# Patient Record
Sex: MALE | Race: WHITE | NOT HISPANIC OR LATINO
[De-identification: names, ages, dates, MRNs, and addresses within clinical notes are randomized per-mention and may not be internally consistent; named-entity substitution may affect disease eponyms.]

---

## 2019-01-03 ENCOUNTER — APPOINTMENT (OUTPATIENT)
Dept: ORTHOPEDIC SURGERY | Facility: CLINIC | Age: 64
End: 2019-01-03
Payer: COMMERCIAL

## 2019-01-03 VITALS — WEIGHT: 250 LBS | HEIGHT: 70 IN | BODY MASS INDEX: 35.79 KG/M2

## 2019-01-03 DIAGNOSIS — M17.0 BILATERAL PRIMARY OSTEOARTHRITIS OF KNEE: ICD-10-CM

## 2019-01-03 DIAGNOSIS — M76.61 ACHILLES TENDINITIS, RIGHT LEG: ICD-10-CM

## 2019-01-03 DIAGNOSIS — M76.62 ACHILLES TENDINITIS, RIGHT LEG: ICD-10-CM

## 2019-01-03 PROCEDURE — 73564 X-RAY EXAM KNEE 4 OR MORE: CPT | Mod: 50

## 2019-01-03 PROCEDURE — 99203 OFFICE O/P NEW LOW 30 MIN: CPT

## 2019-03-21 ENCOUNTER — APPOINTMENT (OUTPATIENT)
Dept: ORTHOPEDIC SURGERY | Facility: CLINIC | Age: 64
End: 2019-03-21

## 2019-04-01 ENCOUNTER — APPOINTMENT (OUTPATIENT)
Dept: INTERNAL MEDICINE | Facility: CLINIC | Age: 64
End: 2019-04-01

## 2021-06-01 ENCOUNTER — INPATIENT (INPATIENT)
Facility: HOSPITAL | Age: 66
LOS: 0 days | Discharge: HOME | DRG: 603 | End: 2021-06-02
Attending: INTERNAL MEDICINE | Admitting: INTERNAL MEDICINE
Payer: MEDICARE

## 2021-06-01 VITALS
HEIGHT: 70 IN | WEIGHT: 270.07 LBS | OXYGEN SATURATION: 100 % | TEMPERATURE: 98 F | RESPIRATION RATE: 16 BRPM | HEART RATE: 88 BPM

## 2021-06-01 DIAGNOSIS — Z98.89 OTHER SPECIFIED POSTPROCEDURAL STATES: Chronic | ICD-10-CM

## 2021-06-01 DIAGNOSIS — L08.9 LOCAL INFECTION OF THE SKIN AND SUBCUTANEOUS TISSUE, UNSPECIFIED: ICD-10-CM

## 2021-06-01 DIAGNOSIS — Z98.49 CATARACT EXTRACTION STATUS, UNSPECIFIED EYE: Chronic | ICD-10-CM

## 2021-06-01 LAB
ALBUMIN SERPL ELPH-MCNC: 4.5 G/DL — SIGNIFICANT CHANGE UP (ref 3.3–5.2)
ALP SERPL-CCNC: 76 U/L — SIGNIFICANT CHANGE UP (ref 40–120)
ALT FLD-CCNC: 23 U/L — SIGNIFICANT CHANGE UP
ANION GAP SERPL CALC-SCNC: 11 MMOL/L — SIGNIFICANT CHANGE UP (ref 5–17)
AST SERPL-CCNC: 24 U/L — SIGNIFICANT CHANGE UP
BASOPHILS # BLD AUTO: 0.05 K/UL — SIGNIFICANT CHANGE UP (ref 0–0.2)
BASOPHILS NFR BLD AUTO: 0.6 % — SIGNIFICANT CHANGE UP (ref 0–2)
BILIRUB SERPL-MCNC: 0.5 MG/DL — SIGNIFICANT CHANGE UP (ref 0.4–2)
BUN SERPL-MCNC: 19.5 MG/DL — SIGNIFICANT CHANGE UP (ref 8–20)
CALCIUM SERPL-MCNC: 9.5 MG/DL — SIGNIFICANT CHANGE UP (ref 8.6–10.2)
CHLORIDE SERPL-SCNC: 102 MMOL/L — SIGNIFICANT CHANGE UP (ref 98–107)
CO2 SERPL-SCNC: 25 MMOL/L — SIGNIFICANT CHANGE UP (ref 22–29)
CREAT SERPL-MCNC: 0.82 MG/DL — SIGNIFICANT CHANGE UP (ref 0.5–1.3)
EOSINOPHIL # BLD AUTO: 0.16 K/UL — SIGNIFICANT CHANGE UP (ref 0–0.5)
EOSINOPHIL NFR BLD AUTO: 1.8 % — SIGNIFICANT CHANGE UP (ref 0–6)
GLUCOSE BLDC GLUCOMTR-MCNC: 103 MG/DL — HIGH (ref 70–99)
GLUCOSE BLDC GLUCOMTR-MCNC: 112 MG/DL — HIGH (ref 70–99)
GLUCOSE SERPL-MCNC: 128 MG/DL — HIGH (ref 70–99)
HCT VFR BLD CALC: 47.3 % — SIGNIFICANT CHANGE UP (ref 39–50)
HGB BLD-MCNC: 15.7 G/DL — SIGNIFICANT CHANGE UP (ref 13–17)
IMM GRANULOCYTES NFR BLD AUTO: 0.3 % — SIGNIFICANT CHANGE UP (ref 0–1.5)
LYMPHOCYTES # BLD AUTO: 1.34 K/UL — SIGNIFICANT CHANGE UP (ref 1–3.3)
LYMPHOCYTES # BLD AUTO: 14.9 % — SIGNIFICANT CHANGE UP (ref 13–44)
MCHC RBC-ENTMCNC: 30.4 PG — SIGNIFICANT CHANGE UP (ref 27–34)
MCHC RBC-ENTMCNC: 33.2 GM/DL — SIGNIFICANT CHANGE UP (ref 32–36)
MCV RBC AUTO: 91.7 FL — SIGNIFICANT CHANGE UP (ref 80–100)
MONOCYTES # BLD AUTO: 1 K/UL — HIGH (ref 0–0.9)
MONOCYTES NFR BLD AUTO: 11.1 % — SIGNIFICANT CHANGE UP (ref 2–14)
NEUTROPHILS # BLD AUTO: 6.41 K/UL — SIGNIFICANT CHANGE UP (ref 1.8–7.4)
NEUTROPHILS NFR BLD AUTO: 71.3 % — SIGNIFICANT CHANGE UP (ref 43–77)
PLATELET # BLD AUTO: 218 K/UL — SIGNIFICANT CHANGE UP (ref 150–400)
POTASSIUM SERPL-MCNC: 4.6 MMOL/L — SIGNIFICANT CHANGE UP (ref 3.5–5.3)
POTASSIUM SERPL-SCNC: 4.6 MMOL/L — SIGNIFICANT CHANGE UP (ref 3.5–5.3)
PROT SERPL-MCNC: 7.4 G/DL — SIGNIFICANT CHANGE UP (ref 6.6–8.7)
RBC # BLD: 5.16 M/UL — SIGNIFICANT CHANGE UP (ref 4.2–5.8)
RBC # FLD: 12.6 % — SIGNIFICANT CHANGE UP (ref 10.3–14.5)
SARS-COV-2 RNA SPEC QL NAA+PROBE: SIGNIFICANT CHANGE UP
SODIUM SERPL-SCNC: 137 MMOL/L — SIGNIFICANT CHANGE UP (ref 135–145)
WBC # BLD: 8.99 K/UL — SIGNIFICANT CHANGE UP (ref 3.8–10.5)
WBC # FLD AUTO: 8.99 K/UL — SIGNIFICANT CHANGE UP (ref 3.8–10.5)

## 2021-06-01 PROCEDURE — 73130 X-RAY EXAM OF HAND: CPT | Mod: 26,RT

## 2021-06-01 PROCEDURE — 99223 1ST HOSP IP/OBS HIGH 75: CPT

## 2021-06-01 PROCEDURE — 99222 1ST HOSP IP/OBS MODERATE 55: CPT

## 2021-06-01 PROCEDURE — 99285 EMERGENCY DEPT VISIT HI MDM: CPT

## 2021-06-01 RX ORDER — DEXTROSE 50 % IN WATER 50 %
25 SYRINGE (ML) INTRAVENOUS ONCE
Refills: 0 | Status: DISCONTINUED | OUTPATIENT
Start: 2021-06-01 | End: 2021-06-02

## 2021-06-01 RX ORDER — ZINC SULFATE TAB 220 MG (50 MG ZINC EQUIVALENT) 220 (50 ZN) MG
1 TAB ORAL
Qty: 0 | Refills: 0 | DISCHARGE

## 2021-06-01 RX ORDER — ATORVASTATIN CALCIUM 80 MG/1
80 TABLET, FILM COATED ORAL AT BEDTIME
Refills: 0 | Status: DISCONTINUED | OUTPATIENT
Start: 2021-06-01 | End: 2021-06-02

## 2021-06-01 RX ORDER — ASPIRIN/CALCIUM CARB/MAGNESIUM 324 MG
1 TABLET ORAL
Qty: 0 | Refills: 0 | DISCHARGE

## 2021-06-01 RX ORDER — DEXTROSE 50 % IN WATER 50 %
12.5 SYRINGE (ML) INTRAVENOUS ONCE
Refills: 0 | Status: DISCONTINUED | OUTPATIENT
Start: 2021-06-01 | End: 2021-06-02

## 2021-06-01 RX ORDER — ACETAMINOPHEN 500 MG
650 TABLET ORAL EVERY 4 HOURS
Refills: 0 | Status: DISCONTINUED | OUTPATIENT
Start: 2021-06-01 | End: 2021-06-02

## 2021-06-01 RX ORDER — HYDROMORPHONE HYDROCHLORIDE 2 MG/ML
0.5 INJECTION INTRAMUSCULAR; INTRAVENOUS; SUBCUTANEOUS
Refills: 0 | Status: DISCONTINUED | OUTPATIENT
Start: 2021-06-01 | End: 2021-06-02

## 2021-06-01 RX ORDER — SODIUM CHLORIDE 9 MG/ML
1000 INJECTION, SOLUTION INTRAVENOUS
Refills: 0 | Status: DISCONTINUED | OUTPATIENT
Start: 2021-06-01 | End: 2021-06-02

## 2021-06-01 RX ORDER — ONDANSETRON 8 MG/1
4 TABLET, FILM COATED ORAL EVERY 6 HOURS
Refills: 0 | Status: DISCONTINUED | OUTPATIENT
Start: 2021-06-01 | End: 2021-06-02

## 2021-06-01 RX ORDER — GLUCAGON INJECTION, SOLUTION 0.5 MG/.1ML
1 INJECTION, SOLUTION SUBCUTANEOUS ONCE
Refills: 0 | Status: DISCONTINUED | OUTPATIENT
Start: 2021-06-01 | End: 2021-06-02

## 2021-06-01 RX ORDER — INSULIN LISPRO 100/ML
VIAL (ML) SUBCUTANEOUS
Refills: 0 | Status: DISCONTINUED | OUTPATIENT
Start: 2021-06-01 | End: 2021-06-02

## 2021-06-01 RX ORDER — VANCOMYCIN HCL 1 G
1850 VIAL (EA) INTRAVENOUS ONCE
Refills: 0 | Status: DISCONTINUED | OUTPATIENT
Start: 2021-06-01 | End: 2021-06-01

## 2021-06-01 RX ORDER — OXYCODONE HYDROCHLORIDE 5 MG/1
5 TABLET ORAL EVERY 4 HOURS
Refills: 0 | Status: DISCONTINUED | OUTPATIENT
Start: 2021-06-01 | End: 2021-06-02

## 2021-06-01 RX ORDER — DEXTROSE 50 % IN WATER 50 %
15 SYRINGE (ML) INTRAVENOUS ONCE
Refills: 0 | Status: DISCONTINUED | OUTPATIENT
Start: 2021-06-01 | End: 2021-06-02

## 2021-06-01 RX ORDER — OXYCODONE HYDROCHLORIDE 5 MG/1
5 TABLET ORAL ONCE
Refills: 0 | Status: DISCONTINUED | OUTPATIENT
Start: 2021-06-01 | End: 2021-06-01

## 2021-06-01 RX ORDER — VANCOMYCIN HCL 1 G
1500 VIAL (EA) INTRAVENOUS ONCE
Refills: 0 | Status: COMPLETED | OUTPATIENT
Start: 2021-06-01 | End: 2021-06-01

## 2021-06-01 RX ORDER — ENOXAPARIN SODIUM 100 MG/ML
40 INJECTION SUBCUTANEOUS DAILY
Refills: 0 | Status: DISCONTINUED | OUTPATIENT
Start: 2021-06-01 | End: 2021-06-02

## 2021-06-01 RX ORDER — CHOLECALCIFEROL (VITAMIN D3) 125 MCG
1 CAPSULE ORAL
Qty: 0 | Refills: 0 | DISCHARGE

## 2021-06-01 RX ORDER — CEFTRIAXONE 500 MG/1
1000 INJECTION, POWDER, FOR SOLUTION INTRAMUSCULAR; INTRAVENOUS ONCE
Refills: 0 | Status: COMPLETED | OUTPATIENT
Start: 2021-06-01 | End: 2021-06-01

## 2021-06-01 RX ORDER — ATORVASTATIN CALCIUM 80 MG/1
1 TABLET, FILM COATED ORAL
Qty: 0 | Refills: 0 | DISCHARGE

## 2021-06-01 RX ADMIN — OXYCODONE HYDROCHLORIDE 5 MILLIGRAM(S): 5 TABLET ORAL at 17:36

## 2021-06-01 RX ADMIN — ATORVASTATIN CALCIUM 80 MILLIGRAM(S): 80 TABLET, FILM COATED ORAL at 22:16

## 2021-06-01 RX ADMIN — Medication 300 MILLIGRAM(S): at 12:02

## 2021-06-01 RX ADMIN — OXYCODONE HYDROCHLORIDE 5 MILLIGRAM(S): 5 TABLET ORAL at 18:00

## 2021-06-01 RX ADMIN — CEFTRIAXONE 100 MILLIGRAM(S): 500 INJECTION, POWDER, FOR SOLUTION INTRAMUSCULAR; INTRAVENOUS at 11:21

## 2021-06-01 RX ADMIN — ENOXAPARIN SODIUM 40 MILLIGRAM(S): 100 INJECTION SUBCUTANEOUS at 22:16

## 2021-06-01 RX ADMIN — CEFTRIAXONE 1000 MILLIGRAM(S): 500 INJECTION, POWDER, FOR SOLUTION INTRAMUSCULAR; INTRAVENOUS at 13:04

## 2021-06-01 NOTE — H&P ADULT - NSICDXFAMILYHX_GEN_ALL_CORE_FT
FAMILY HISTORY:  Mother  Still living? No  Family history of emphysema, Age at diagnosis: Age Unknown    Sibling  Still living? No  Family history of emphysema, Age at diagnosis: Age Unknown  Family history of heart failure, Age at diagnosis: Age Unknown

## 2021-06-01 NOTE — ED ADULT NURSE NOTE - PSH
S/P cataract extraction  bilateral 2013  S/P knee surgery  left patella tendon repair 2007  S/P shoulder surgery  rigth bicep and tendon repair 2011  S/P tonsillectomy and adenoidectomy  1960

## 2021-06-01 NOTE — H&P ADULT - NSHPSOCIALHISTORY_GEN_ALL_CORE
tobacco:  alcohol:  illicits:  lives with:  occupation: tobacco: never  alcohol: occasional  illicits: no  lives with: wife  occupation: semi-retired.  works in software marketing

## 2021-06-01 NOTE — ED STATDOCS - OBJECTIVE STATEMENT
Pt is a 67yo M presenting with finger pain. he states that he slammed his middle finger in a door around 2 weeks ago. States that it was painful and he had a cut/lesion at his nail bed. He reports that it was painful but he was managing. he reports that within the last day he noted it got worse. He reports pain, swelling, redness to the finger now. Denies fevers, chills, chest pain, sob, nausea, vomiting, diarrhea

## 2021-06-01 NOTE — ED STATDOCS - ATTENDING CONTRIBUTION TO CARE
I, Cyril Loera, performed a face to face bedside interview with this patient regarding history of present illness, review of symptoms and relevant past medical, social and family history.  I completed an independent physical examination. I have communicated the patient’s plan of care and disposition with the resident.  66 year old male with PMh HLD and DM presents with finger infection. Pt states that he had an injury to the finger 2 days ago, it had been doing well, but then yesterday it started to become grossly red and swollen.   Gen: NAD, well appearing  CV: RRR  Pul: CTA b/l  Abd: Soft, non-distended, non-tender  Neuro: no focal deficits  msk: fusiform swelling of the finger, held in passive flexion, pain with extension, no pain to palpation along the flexor tendon  Pt to be admitted for IV Abx, hand surgery following, no surgical intervention at this time

## 2021-06-01 NOTE — ED STATDOCS - PHYSICAL EXAMINATION
Gen: Well appearing in NAD  Head: NC/AT  Neck: Trachea midline  Resp: No distress  Ext: R middle finger with fusiform swelling, held in flexion, pain with extension, and pain to flexor tendon at DIP  Neuro: A&O appears non focal  Skin: Warm and dry as visualized  Psych: Normal affect and mood

## 2021-06-01 NOTE — ED STATDOCS - CLINICAL SUMMARY MEDICAL DECISION MAKING FREE TEXT BOX
Patient with probable flexor tenosynovitis. Antibiotics and labs ordered, hand surgery consult placed. ID doctor Iam aware and evaluated patient. To admit.

## 2021-06-01 NOTE — H&P ADULT - HISTORY OF PRESENT ILLNESS
67yo m with PMH sig for HLD, diet controlled DM presenting with post-traumatic finger pain, swelling, and redness. He states that he slammed his middle finger in a door around 2 weeks ago. States that it was painful and he had a cut/lesion at his nail bed. Over the last 24-48 hrs pt reports that within the last day he has had increasing pain, swelling, redness to the finger now. Denies fevers, chills, chest pain, sob, nausea, vomiting, diarrhea 67yo m with PMH sig for HLD, diet controlled DM presenting with post-traumatic finger pain, swelling, and redness. He states that he slammed his middle finger in a door around 2 weeks ago. States that it was painful and he had a cut/lesion at his nail bed. Over the last 24-48 hrs  he has had increasing pain, swelling, redness to the finger now.  Some serous drainage from opening in nail bed. Otherwise feels well- Denies fevers, chills, chest pain, sob, nausea, vomiting, diarrhea

## 2021-06-01 NOTE — CONSULT NOTE ADULT - SUBJECTIVE AND OBJECTIVE BOX
Pt Name: LUCIA ROSS    MRN: [id number]      Patient is a 66y yo M presenting with right hand third digit pain and swelling. Significant PMHx Diabetes, Pt reports he slammed his right middle finger in a door around 2-3 weeks ago. Pt reports he had a cut at his nail bed, that was draining some "serous fluid" at this time. His pain and swelling was improving, but yesterday it began to worsen. Pt states he has been taking 800mg ibuprofen with minimal relief. He reports pain, swelling, redness to the finger worsening over the last 24 hours. Denies fevers, chills, chest pain, sob, nausea, vomiting, diarrhea.    PAST MEDICAL & SURGICAL HISTORY:  PAST MEDICAL & SURGICAL HISTORY:  Patellar disorder  right    Hypercholesteremia    Cataract    S/P knee surgery  left patella tendon repair 2007    S/P shoulder surgery  rigth bicep and tendon repair 2011    S/P tonsillectomy and adenoidectomy  1960    S/P cataract extraction  bilateral 2013    Allergies: No Known Allergies    Medications:     FAMILY HISTORY:  Family history of emphysema (Mother, Sibling)  mother    Family history of heart failure (Sibling)  Fen Fen caused heart failure    : non-contributory    Social History:                           15.7   8.99  )-----------( 218      ( 01 Jun 2021 11:13 )             47.3       06-01    137  |  102  |  19.5  ----------------------------<  128<H>  4.6   |  25.0  |  0.82    Ca    9.5      01 Jun 2021 11:13    TPro  7.4  /  Alb  4.5  /  TBili  0.5  /  DBili  x   /  AST  24  /  ALT  23  /  AlkPhos  76  06-01      Vital Signs Last 24 Hrs  T(C): 36.6 (01 Jun 2021 10:18), Max: 36.6 (01 Jun 2021 10:18)  T(F): 97.9 (01 Jun 2021 10:18), Max: 97.9 (01 Jun 2021 10:18)  HR: 88 (01 Jun 2021 10:18) (88 - 88)  BP: 155/80 (01 Jun 2021 10:20) (155/80 - 155/80)  BP(mean): --  RR: 16 (01 Jun 2021 10:18) (16 - 16)  SpO2: 100% (01 Jun 2021 10:18) (100% - 100%)    Daily Height in cm: 177.8 (01 Jun 2021 10:18)    Daily     PHYSICAL EXAM:    Appearance: Alert, responsive, in no acute distress.  Right Hand: right hand third digit mild +soft tissue swelling to digit, +erythema to DIP and distal phalanx. +TTP over nail bed and DIP, no fluctuance noted, skin is intact throughout, no active bleeding/drainage noted, ROM DIP decreased due to pain and swelling, PIP/MCP nontender with ROM intact and near full, SILT. 4th digit DIP nodule noted, pt states it is chronic, and non-tender.     Imaging Studies:    < from: Xray Hand 3 Views, Right (06.01.21 @ 12:12) >     EXAM:  XR HAND MIN 3 VIEWS RT                          PROCEDURE DATE:  06/01/2021      INTERPRETATION:  Clinical history: 66-year-old male, middle finger fracture.    Three views of the right hand without comparison demonstrate mild degenerative change with no fracture dislocation or radiographic soft tissue abnormality.    IMPRESSION:  Mild OA with no acute radiographic findings in particular the third digit is intact    DANNY THOMSA DO; Attending Radiologist  This document has been electronically signed. Jun 1 2021 12:20PM    < end of copied text >    A/P:  Pt is a  66y Male with found to have right hand third digit swelling    PLAN:   * ID Consult  * IV ABX  * Pain Control  * XR noted  * Cont. care per ED/Medicine  * case discussed w Dr Leroy Pt Name: LUCIA ROSS    MRN: [id number]      Patient is a 66y yo M presenting with right hand third digit pain and swelling. Significant PMHx Diabetes, Pt reports he slammed his right middle finger in a door around 2-3 weeks ago. Pt reports he had a cut at his nail cuticle, that was draining some "serous fluid" at this time. His pain and swelling was improving, but yesterday it began to worsen. Pt states he has been taking 800mg ibuprofen with minimal relief. He reports pain, swelling, redness to the finger worsening over the last 24 hours. Denies fevers, chills, chest pain, sob, nausea, vomiting, diarrhea.    PAST MEDICAL & SURGICAL HISTORY:  PAST MEDICAL & SURGICAL HISTORY:  Patellar disorder  right    Hypercholesteremia    Cataract    S/P knee surgery  left patella tendon repair 2007    S/P shoulder surgery  rigth bicep and tendon repair 2011    S/P tonsillectomy and adenoidectomy  1960    S/P cataract extraction  bilateral 2013    Allergies: No Known Allergies    Medications:     FAMILY HISTORY:  Family history of emphysema (Mother, Sibling)  mother    Family history of heart failure (Sibling)  Fen Fen caused heart failure    : non-contributory    Social History:                           15.7   8.99  )-----------( 218      ( 01 Jun 2021 11:13 )             47.3       06-01    137  |  102  |  19.5  ----------------------------<  128<H>  4.6   |  25.0  |  0.82    Ca    9.5      01 Jun 2021 11:13    TPro  7.4  /  Alb  4.5  /  TBili  0.5  /  DBili  x   /  AST  24  /  ALT  23  /  AlkPhos  76  06-01      Vital Signs Last 24 Hrs  T(C): 36.6 (01 Jun 2021 10:18), Max: 36.6 (01 Jun 2021 10:18)  T(F): 97.9 (01 Jun 2021 10:18), Max: 97.9 (01 Jun 2021 10:18)  HR: 88 (01 Jun 2021 10:18) (88 - 88)  BP: 155/80 (01 Jun 2021 10:20) (155/80 - 155/80)  BP(mean): --  RR: 16 (01 Jun 2021 10:18) (16 - 16)  SpO2: 100% (01 Jun 2021 10:18) (100% - 100%)    Daily Height in cm: 177.8 (01 Jun 2021 10:18)    Daily     PHYSICAL EXAM:    Appearance: Alert, responsive, in no acute distress.  Right Hand: right hand third digit mild-moderate +soft tissue swelling to digit, +erythema to DIP and distal phalanx. +TTP over nail bed and DIP, no fluctuance noted, skin is intact throughout, no active bleeding/drainage noted but there is yellowish crusty dried scab at the proximal nail fold, ROM DIP significantly decreased due to pain and swelling, PIP/MCP nontender with ROM intact and near full, SILT. 4th digit DIP mucous cyst noted, pt states it is chronic, and non-tender.     Imaging Studies:    < from: Xray Hand 3 Views, Right (06.01.21 @ 12:12) >     EXAM:  XR HAND MIN 3 VIEWS RT                          PROCEDURE DATE:  06/01/2021      INTERPRETATION:  Clinical history: 66-year-old male, middle finger fracture.    Three views of the right hand without comparison demonstrate mild degenerative change with no fracture dislocation or radiographic soft tissue abnormality.    IMPRESSION:  Mild OA with no acute radiographic findings in particular the third digit is intact    DANNY THOMAS DO; Attending Radiologist  This document has been electronically signed. Jun 1 2021 12:20PM    < end of copied text >    A/P:  Pt is a  66y Male with found to have right hand third digit swelling - possible DIP joint infection secondary to draining mucous cyst    PLAN:   * ID Consult  * IV ABX  * Pain Control  * XR noted  * Cont. care per ED/Medicine  * case discussed w Dr Leroy

## 2021-06-01 NOTE — CONSULT NOTE ADULT - SUBJECTIVE AND OBJECTIVE BOX
INFECTIOUS DISEASES AND INTERNAL MEDICINE at [facility]  =======================================================  Talon Charlton MD  Diplomates American Board of Internal Medicine and Infectious Diseases  Telephone [phone]  Fax            [phone]  =======================================================    LUCIA [id number]      HPI: 67yo M presenting with finger pain. he states that he slammed his middle finger in a door around 2 weeks ago. States that it was painful and he had a cut/lesion at his nail bed. He reports that it was painful but he was managing. he reports that within the last day he noted it got worse. He reports pain, swelling, redness to the finger now. Denies fevers, chills, chest pain, sob, nausea, vomiting, diarrhea  PT WITH INCREASED PAIN AND SWELLING RIGHT MIDDLE FINGER  CONCERN FOR INFECTIOUS PROCESS  ASKED TO EVALUATE FROM ID STANDPOINT         PAST MEDICAL & SURGICAL HISTORY:  Patellar disorder  right    Hypercholesteremia    Cataract    S/P knee surgery  left patella tendon repair 2007    S/P shoulder surgery  rigth bicep and tendon repair 2011    S/P tonsillectomy and adenoidectomy  1960    S/P cataract extraction  bilateral 2013        ANTIBIOTICS  vancomycin  IVPB 1500 milliGRAM(s) IV Intermittent once      Allergies    No Known Allergies    Intolerances        SOCIAL HISTORY:     FAMILY HX   FAMILY HISTORY:  Family history of emphysema (Mother, Sibling)  mother    Family history of heart failure (Sibling)  Fen Fen caused heart failure        Vital Signs Last 24 Hrs  T(C): 36.6 (01 Jun 2021 10:18), Max: 36.6 (01 Jun 2021 10:18)  T(F): 97.9 (01 Jun 2021 10:18), Max: 97.9 (01 Jun 2021 10:18)  HR: 88 (01 Jun 2021 10:18) (88 - 88)  BP: 155/80 (01 Jun 2021 10:20) (155/80 - 155/80)  BP(mean): --  RR: 16 (01 Jun 2021 10:18) (16 - 16)  SpO2: 100% (01 Jun 2021 10:18) (100% - 100%)  Drug Dosing Weight  Height (cm): 177.8 (01 Jun 2021 10:18)  Weight (kg): 122.5 (01 Jun 2021 10:18)  BMI (kg/m2): 38.8 (01 Jun 2021 10:18)  BSA (m2): 2.37 (01 Jun 2021 10:18)      REVIEW OF SYSTEMS:    CONSTITUTIONAL:  As per HPI.    HEENT:  Eyes:  No diplopia or blurred vision. ENT:  No earache, sore throat or runny nose.    CARDIOVASCULAR:  No pressure, squeezing, strangling, tightness, heaviness or aching about the chest, neck, axilla or epigastrium.    RESPIRATORY:  No cough, shortness of breath, PND or orthopnea.    GASTROINTESTINAL:  No nausea, vomiting or diarrhea.    GENITOURINARY:  No dysuria, frequency or urgency.    MUSCULOSKELETAL:  As per HPI.    SKIN:  No change in skin, hair or nails.    NEUROLOGIC:  No paresthesias, fasciculations, seizures or weakness.                  PHYSICAL EXAMINATION:    GENERAL: The patient is a well-developed, well-nourished _____in no apparent distress. ___ is alert and oriented x3.    VITAL SIGNS: T(C): 36.6 (06-01-21 @ 10:18), Max: 36.6 (06-01-21 @ 10:18)  HR: 88 (06-01-21 @ 10:18) (88 - 88)  BP: 155/80 (06-01-21 @ 10:20) (155/80 - 155/80)  RR: 16 (06-01-21 @ 10:18) (16 - 16)  SpO2: 100% (06-01-21 @ 10:18) (100% - 100%)  Wt(kg): --    HEENT: Head is normocephalic and atraumatic.  ANICTERIC  NECK: Supple. No carotid bruits.  No lymphadenopathy or thyromegaly.    LUNGS:COARSE BREATH SOUNDS    HEART: Regular rate and rhythm without murmur.    ABDOMEN: Soft, nontender, and nondistended.  Positive bowel sounds.  No hepatosplenomegaly was noted. NO REBOUND NO GUARDING    EXTREMITIES: RIGHT MIDDLE FINGER WITH EDEMA AND ERYTHEMA TENDER AT DISTAL AREA NO CREPITUS   2ND FINGER WITH SMALL CYST  AS WELL    NEUROLOGIC: NON FOCAL               BLOOD CULTURES       URINE CX          LABS:                        15.7   8.99  )-----------( 218      ( 01 Jun 2021 11:13 )             47.3     06-01    137  |  102  |  19.5  ----------------------------<  128<H>  4.6   |  25.0  |  0.82    Ca    9.5      01 Jun 2021 11:13    TPro  7.4  /  Alb  4.5  /  TBili  0.5  /  DBili  x   /  AST  24  /  ALT  23  /  AlkPhos  76  06-01          RADIOLOGY & ADDITIONAL STUDIES:      ASSESSMENT/PLAN     67yo M presenting with finger pain. he states that he slammed his middle finger in a door around 2 weeks ago. States that it was painful and he had a cut/lesion at his nail bed. He reports that it was painful but he was managing. he reports that within the last day he noted it got worse. He reports pain, swelling, redness to the finger now. Denies fevers, chills, chest pain, sob, nausea, vomiting, diarrhea  PT WITH INCREASED PAIN AND SWELLING RIGHT MIDDLE FINGER  CONCERN FOR INFECTIOUS PROCESS  WILL CHECK LABS SED RATE  BLOOD X X2 SETS   AGREE WITH EMPIRIC IV ABX VANCO/ROCEPHIN TO COVER STAPH STREP  HAND SURGERY TO EVAL AS CONCERN FOR INFECTED FLEXORTENOSYNOVTIS  MAY  NEED ASPIRATION OF FINGER  XRAY AND POSSIBLE MRI   WILL FOLLOW UP WITH FURTHER RECOMMENDATIONS            GEOVANNA MÉNDEZ MD

## 2021-06-01 NOTE — H&P ADULT - ASSESSMENT
65yo m with PMH sig for HLD, diet controlled DM presenting with post-traumatic finger pain, swelling, and redness.  ED evaluation concerning for right middle finder cellulitis.  Labs unremarkable with exception of CRP 7 and glucose 128.  Hand x-ray with no acute findings.  Seen by hand surgery- no surgical intervention or aspiration planned.  Agree with antibiotics.  Seen by ID who recommends blood cx, f/b vanc/rocephin IV abx.    Right middle finger post-traumatic cellulitis  -x-ray with no acute findings  -blood cx drawn  -vanc/rocephin given-->will continue  -ID consulted  -Hand surgery consulted.  No operative intervention planned at this time.  Their service will follow    DM2- diet controlled  -CBG's/Insulin correction    HLD 65yo m with PMH sig for HLD, diet controlled DM presenting with post-traumatic finger pain, swelling, and redness.  ED evaluation concerning for right middle finder cellulitis.  Labs unremarkable with exception of CRP 7 and glucose 128.  Hand x-ray with no acute findings.  Seen by hand surgery- no surgical intervention or aspiration planned.  Agree with antibiotics.  Seen by ID who recommends blood cx, f/b vanc/rocephin IV abx.    Right middle finger post-traumatic cellulitis  -x-ray with no acute findings  -blood cx drawn  -vanc/rocephin given-->will continue  -ID consulted  -Hand surgery consulted.  No operative intervention planned at this time.  Their service will follow    DM2- diet controlled  -CBG's/Insulin correction    HLD  -continue lipitor    full code  lovenox for vte prophy  dispo: anticipate will be home with wife.  Wife updated at the bedside

## 2021-06-01 NOTE — CONSULT NOTE ADULT - ATTENDING COMMENTS
I saw and examined this patient today [facility]. History reviewed as above. Exam modified above.  May have infected right long finger DIP joint secondary to skin jaime extension from draining mucous cyst.  Recommend peroxide soaks TID, elevation, IV antibiotics.  - If significant improvement over next couple of days, may avoid need for surgical I&D.  - If lacks significant improvement or worsens, will need I&D.  - will follow.

## 2021-06-01 NOTE — ED ADULT TRIAGE NOTE - CHIEF COMPLAINT QUOTE
pt c/o middle finger swelling and pain sent by [facility] for joint infection of right middle finger per pt Nate was called.  patient offers no other complaints. swelling and pain started last night.

## 2021-06-01 NOTE — H&P ADULT - NSICDXPASTSURGICALHX_GEN_ALL_CORE_FT
PAST SURGICAL HISTORY:  S/P cataract extraction bilateral 2013    S/P knee surgery left patella tendon repair 2007, right patella tendon repair 2014    S/P shoulder surgery rigth bicep and tendon repair 2011    S/P tonsillectomy and adenoidectomy 1960

## 2021-06-01 NOTE — ED STATDOCS - NS ED ROS FT
General: Denies fever, chills  MSK: Denies back pain, joint pain  Resp: Denies cough, SOB  CV: Denies CP, palpitations  GI: Denies nausea, vomiting  Neuro: Denies numbness, tingling  Skin: Denies rashes, lacerations  Ext: R middle finger pain and swelling

## 2021-06-01 NOTE — H&P ADULT - NSHPPHYSICALEXAM_GEN_ALL_CORE
Vital Signs Last 24 Hrs  T(C): 36.6 (01 Jun 2021 10:18), Max: 36.6 (01 Jun 2021 10:18)  T(F): 97.9 (01 Jun 2021 10:18), Max: 97.9 (01 Jun 2021 10:18)  HR: 88 (01 Jun 2021 10:18) (88 - 88)  BP: 155/80 (01 Jun 2021 10:20) (155/80 - 155/80)  BP(mean): --  RR: 16 (01 Jun 2021 10:18) (16 - 16)  SpO2: 100% (01 Jun 2021 10:18) (100% - 100%) Vital Signs Last 24 Hrs  T(C): 36.6 (01 Jun 2021 10:18), Max: 36.6 (01 Jun 2021 10:18)  T(F): 97.9 (01 Jun 2021 10:18), Max: 97.9 (01 Jun 2021 10:18)  HR: 88 (01 Jun 2021 10:18) (88 - 88)  BP: 155/80 (01 Jun 2021 10:20) (155/80 - 155/80)  BP(mean): --  RR: 16 (01 Jun 2021 10:18) (16 - 16)  SpO2: 100% (01 Jun 2021 10:18) (100% - 100%)    GEN:  A and O x4, well nourished, well developed. No apparent distress.  HEENT: PERRL, EOMI, neck supple, no cervical/SC LAD.  OP moist, no erythema, no exudate  CV: RRR without m/g/r.  No S3/S4.  No carotid bruit.  No JVD.  2+ rad/DP pulses.  extremities warm and well perfused  LUNG: CTA B without wheezes, crackles.  Equal air movement bilaterally.  no stridor  ABD:  soft, non-distended.  nl bowel sounds.  No tenderness to palpation, no guarding/rebound  EXT: no clubbing, cyanosis.  Right middle finger with edema, erythema, warmth.  Tender to movement and palpation  SKIN:  right middle finger as above.  open lesion at base of nailbed with no active drainage. no rash. Warm and pink.  right pointer finger with chronic wart-like lesion overlying DIP joint  NEURO:  CN 2-12 grossly intact.  Motor 5/5 B U/LE.  DTR's 2+ and equal bilaterally.  No gross sensory changes.  No gross visual changes.  MSK: Moves all 4 equally, no gross abnl

## 2021-06-01 NOTE — ED ADULT NURSE NOTE - OBJECTIVE STATEMENT
Pt with swelling, redness and pain to right 3rtd digit that started this morning. Pt reports slamming finger in car door last week but had no discomfort at the time.

## 2021-06-01 NOTE — PATIENT PROFILE ADULT - NSPROSPHOSPCHAPLAINYN_GEN_A_NUR
----- Message from Ty Earl DO sent at 1/23/2019 10:42 AM CST -----  Please give him a stat consultation with Dr. Evans or primary care. To get him started on Lovenox and Coumadin.       
Writer spoke with STANFORD Maxwell (internal medicine anticoagulation clinic) and informed her of Dr. Earl's recommendations below. She will contact patient and make an appointment for today-   
no

## 2021-06-02 ENCOUNTER — TRANSCRIPTION ENCOUNTER (OUTPATIENT)
Age: 66
End: 2021-06-02

## 2021-06-02 VITALS
OXYGEN SATURATION: 95 % | HEART RATE: 72 BPM | TEMPERATURE: 99 F | SYSTOLIC BLOOD PRESSURE: 120 MMHG | RESPIRATION RATE: 18 BRPM | DIASTOLIC BLOOD PRESSURE: 77 MMHG

## 2021-06-02 LAB
A1C WITH ESTIMATED AVERAGE GLUCOSE RESULT: 5.8 % — HIGH (ref 4–5.6)
COVID-19 SPIKE DOMAIN AB INTERP: POSITIVE
COVID-19 SPIKE DOMAIN ANTIBODY RESULT: >250 U/ML — HIGH
ESTIMATED AVERAGE GLUCOSE: 120 MG/DL — HIGH (ref 68–114)
GLUCOSE BLDC GLUCOMTR-MCNC: 115 MG/DL — HIGH (ref 70–99)
GLUCOSE BLDC GLUCOMTR-MCNC: 116 MG/DL — HIGH (ref 70–99)
HCV AB S/CO SERPL IA: 0.13 S/CO — SIGNIFICANT CHANGE UP (ref 0–0.99)
HCV AB SERPL-IMP: SIGNIFICANT CHANGE UP
SARS-COV-2 IGG+IGM SERPL QL IA: >250 U/ML — HIGH
SARS-COV-2 IGG+IGM SERPL QL IA: POSITIVE

## 2021-06-02 PROCEDURE — 80053 COMPREHEN METABOLIC PANEL: CPT

## 2021-06-02 PROCEDURE — 73130 X-RAY EXAM OF HAND: CPT

## 2021-06-02 PROCEDURE — 36415 COLL VENOUS BLD VENIPUNCTURE: CPT

## 2021-06-02 PROCEDURE — 83036 HEMOGLOBIN GLYCOSYLATED A1C: CPT

## 2021-06-02 PROCEDURE — 99239 HOSP IP/OBS DSCHRG MGMT >30: CPT

## 2021-06-02 PROCEDURE — 96375 TX/PRO/DX INJ NEW DRUG ADDON: CPT

## 2021-06-02 PROCEDURE — 85652 RBC SED RATE AUTOMATED: CPT

## 2021-06-02 PROCEDURE — 96365 THER/PROPH/DIAG IV INF INIT: CPT

## 2021-06-02 PROCEDURE — U0003: CPT

## 2021-06-02 PROCEDURE — 76942 ECHO GUIDE FOR BIOPSY: CPT | Mod: 26,59

## 2021-06-02 PROCEDURE — 99285 EMERGENCY DEPT VISIT HI MDM: CPT | Mod: 25

## 2021-06-02 PROCEDURE — 76937 US GUIDE VASCULAR ACCESS: CPT | Mod: 26,59

## 2021-06-02 PROCEDURE — 86803 HEPATITIS C AB TEST: CPT

## 2021-06-02 PROCEDURE — 36556 INSERT NON-TUNNEL CV CATH: CPT

## 2021-06-02 PROCEDURE — 86140 C-REACTIVE PROTEIN: CPT

## 2021-06-02 PROCEDURE — U0005: CPT

## 2021-06-02 PROCEDURE — 85025 COMPLETE CBC W/AUTO DIFF WBC: CPT

## 2021-06-02 PROCEDURE — 99232 SBSQ HOSP IP/OBS MODERATE 35: CPT

## 2021-06-02 PROCEDURE — 96366 THER/PROPH/DIAG IV INF ADDON: CPT

## 2021-06-02 PROCEDURE — 87040 BLOOD CULTURE FOR BACTERIA: CPT

## 2021-06-02 PROCEDURE — 82962 GLUCOSE BLOOD TEST: CPT

## 2021-06-02 PROCEDURE — 86769 SARS-COV-2 COVID-19 ANTIBODY: CPT

## 2021-06-02 RX ORDER — VANCOMYCIN HCL 1 G
1000 VIAL (EA) INTRAVENOUS EVERY 12 HOURS
Refills: 0 | Status: DISCONTINUED | OUTPATIENT
Start: 2021-06-02 | End: 2021-06-02

## 2021-06-02 RX ORDER — CEFTRIAXONE 500 MG/1
1000 INJECTION, POWDER, FOR SOLUTION INTRAMUSCULAR; INTRAVENOUS EVERY 24 HOURS
Refills: 0 | Status: DISCONTINUED | OUTPATIENT
Start: 2021-06-02 | End: 2021-06-02

## 2021-06-02 RX ORDER — CEFAZOLIN SODIUM 1 G
2000 VIAL (EA) INJECTION ONCE
Refills: 0 | Status: COMPLETED | OUTPATIENT
Start: 2021-06-02 | End: 2021-06-02

## 2021-06-02 RX ORDER — CEFAZOLIN SODIUM 1 G
VIAL (EA) INJECTION
Refills: 0 | Status: DISCONTINUED | OUTPATIENT
Start: 2021-06-02 | End: 2021-06-02

## 2021-06-02 RX ORDER — SACCHAROMYCES BOULARDII 250 MG
250 POWDER IN PACKET (EA) ORAL
Refills: 0 | Status: DISCONTINUED | OUTPATIENT
Start: 2021-06-02 | End: 2021-06-02

## 2021-06-02 RX ORDER — CEFAZOLIN SODIUM 1 G
2000 VIAL (EA) INJECTION EVERY 8 HOURS
Refills: 0 | Status: DISCONTINUED | OUTPATIENT
Start: 2021-06-02 | End: 2021-06-02

## 2021-06-02 RX ORDER — CEFAZOLIN SODIUM 1 G
2 VIAL (EA) INJECTION
Qty: 2 | Refills: 0
Start: 2021-06-02

## 2021-06-02 RX ORDER — SACCHAROMYCES BOULARDII 250 MG
1 POWDER IN PACKET (EA) ORAL
Qty: 60 | Refills: 0
Start: 2021-06-02 | End: 2021-07-01

## 2021-06-02 RX ADMIN — Medication 250 MILLIGRAM(S): at 02:38

## 2021-06-02 RX ADMIN — Medication 100 MILLIGRAM(S): at 13:38

## 2021-06-02 RX ADMIN — Medication 250 MILLIGRAM(S): at 13:37

## 2021-06-02 RX ADMIN — ENOXAPARIN SODIUM 40 MILLIGRAM(S): 100 INJECTION SUBCUTANEOUS at 13:38

## 2021-06-02 NOTE — DISCHARGE NOTE PROVIDER - NSDCMRMEDTOKEN_GEN_ALL_CORE_FT
Advil 200 mg oral tablet: 2 tab(s) orally every 6 hours, As Needed  aspirin 81 mg oral tablet: 1 tab(s) orally once a day, As Needed  ceFAZolin 2 g/50 mL-iso-osmotic dextrose intravenous solution: 2 gram(s) intravenously every 8 hours   through 6/14  weekly cbc cmp  Lipitor 80 mg oral tablet: 1 tab(s) orally once a day  Vitamin D3 400 intl units (10 mcg) oral tablet: 1 tab(s) orally once a day  zinc sulfate 220 mg oral tablet: 1 tab(s) orally once a day, As Needed

## 2021-06-02 NOTE — DISCHARGE NOTE PROVIDER - NSDCPNSUBOBJ_GEN_ALL_CORE
pt seen and examined    denies pain  wants to go home    PHYSICAL EXAM:    Vital Signs Last 24 Hrs  T(C): 37 (02 Jun 2021 11:33), Max: 37.4 (01 Jun 2021 20:39)  T(F): 98.6 (02 Jun 2021 11:33), Max: 99.4 (01 Jun 2021 20:39)  HR: 72 (02 Jun 2021 11:33) (72 - 80)  BP: 120/77 (02 Jun 2021 11:33) (113/76 - 132/84)  BP(mean): --  RR: 18 (02 Jun 2021 11:33) (18 - 20)  SpO2: 95% (02 Jun 2021 11:33) (93% - 97%)    GENERAL: Pt lying comfortably, eager to go home  NECK: soft, Supple, No JVD,   CHEST/LUNG: Clear to auscultation bilaterally; No wheezing.  HEART: S1S2+, Regular rate and rhythm; No murmurs.  MUSCULOSKELETAL: LROM of R  middle finger  SKIN: redness and moderate swelling in R middle finger   NEURO: AAOX3, no focal deficits, no motor r sensory loss.                          15.7   8.99  )-----------( 218      ( 01 Jun 2021 11:13 )             47.3   06-01    137  |  102  |  19.5  ----------------------------<  128<H>  4.6   |  25.0  |  0.82    Ca    9.5      01 Jun 2021 11:13    TPro  7.4  /  Alb  4.5  /  TBili  0.5  /  DBili  x   /  AST  24  /  ALT  23  /  AlkPhos  76  06-01    Assessment and Plan:   	  65yo m with PMH sig for HLD, diet controlled DM presenting with post-traumatic finger pain, swelling, and redness.  ED evaluation concerning for right middle finder cellulitis.  Labs unremarkable with exception of CRP 7 and glucose 128.  Hand x-ray with no acute findings.  Seen by hand surgery- no surgical intervention or aspiration planned.  Agree with antibiotics.  Seen by ID who recommends blood cx, f/b vanc/rocephin IV abx.    Right middle finger post-traumatic cellulitis  -x-ray with no acute findings  -blood cx drawn  -IV CEFAZOLIN FOR TWO Weeks  FOLLOW UP IN OFFICE MONDAY  FOLLOW UP WITH HAND SURGERY    DM2- diet controlled  -CBG's/Insulin correction    HLD  -continue lipitor    Midline placed. discharge home

## 2021-06-02 NOTE — DISCHARGE NOTE NURSING/CASE MANAGEMENT/SOCIAL WORK - PATIENT PORTAL LINK FT
You can access the FollowMyHealth Patient Portal offered by [facility] by registering at the following website: [email or web address]. By joining [facility]’s FollowMyHealth portal, you will also be able to view your health information using other applications (apps) compatible with our system.

## 2021-06-02 NOTE — DISCHARGE NOTE PROVIDER - HOSPITAL COURSE
66 y.o male with PMHx of DM and HLD presented with post traumatic right middle finger swelling and redness 2 weeks ago. Xary for hand showed  no fracture. Patient diagnosed with cellulitis and treated with Vanco and ceftriaxone. Orthopedic saw patient and recommended patient follow up after 5-7 days. medline insertion done, and continuos Abx at home. Patient HBA1C is 5.8. Patient instructed to come back to ED if there is fever, pain , or swelling. Patient also instructed to follow up with orthopedic within 5-7 days. 66 y.o male with PMHx of HLD presented with post traumatic right middle finger swelling and redness 2 weeks ago. Xary for hand showed  no fracture. Patient diagnosed with cellulitis and treated with Vanco and ceftriaxone. Orthopedic saw patient and recommended patient follow up with hand surgery after 5-7 days. midline insertion done, and continuos Abx at home for 2 weeks as per infectious disease. Patient HBA1C is 5.8. Patient instructed to come back to ED if there is fever, pain , or swelling. Patient also instructed to follow up with hand surgeon within 5-7 days.  PHYSICAL EXAM:    Vital Signs Last 24 Hrs  T(C): 37 (02 Jun 2021 11:33), Max: 37.4 (01 Jun 2021 20:39)  T(F): 98.6 (02 Jun 2021 11:33), Max: 99.4 (01 Jun 2021 20:39)  HR: 72 (02 Jun 2021 11:33) (72 - 80)  BP: 120/77 (02 Jun 2021 11:33) (113/76 - 132/84)  BP(mean): --  RR: 18 (02 Jun 2021 11:33) (18 - 20)  SpO2: 95% (02 Jun 2021 11:33) (93% - 97%)    GENERAL: Pt lying comfortably, NAD.  NECK: soft, Supple, No JVD,   CHEST/LUNG: Clear to auscultation bilaterally; No wheezing.  HEART: S1S2+, Regular rate and rhythm; No murmurs.  MUSCULOSKELETAL: LROM of R  middle finger  SKIN: redness and moderate swelling in R middle finger   NEURO: AAOX3, no focal deficits, no motor r sensory loss.  All electrolyte abnormalities were monitored carefully and repleted as necessary during this hospitalization. At the time of discharge patient was hemodynamically stable and amenable to all terms of discharge. The patient has received verbal instructions from myself regarding discharge plans.     Length of Discharge: 30 MIN   66 y.o male with PMHx of HLD presented with post traumatic right middle finger swelling and redness 2 weeks ago. Xary for hand showed  no fracture. Patient diagnosed with cellulitis and treated with Vanco and ceftriaxone. Orthopedic saw patient and recommended patient follow up with hand surgery after 5-7 days. midline insertion done, and continuos Abx at home for 2 weeks as per infectious disease. Patient HBA1C is 5.8. Patient instructed to come back to ED if there is fever, pain , or swelling. Patient also instructed to follow up with hand surgeon within 5-7 days.      All electrolyte abnormalities were monitored carefully and repleted as necessary during this hospitalization. At the time of discharge patient was hemodynamically stable and amenable to all terms of discharge. The patient has received verbal instructions from myself regarding discharge plans.     Length of Discharge: 30 MIN

## 2021-06-02 NOTE — DISCHARGE NOTE PROVIDER - NSDCCPCAREPLAN_GEN_ALL_CORE_FT
PRINCIPAL DISCHARGE DIAGNOSIS  Diagnosis: Cellulitis of right finger  Assessment and Plan of Treatment: Contineous with Abx and follow with hand surgery and Infectious disease.  Continoue probiotics      SECONDARY DISCHARGE DIAGNOSES  Diagnosis: Hyperlipemia  Assessment and Plan of Treatment: Continue low fat and cholesterol diet

## 2021-06-02 NOTE — DISCHARGE NOTE NURSING/CASE MANAGEMENT/SOCIAL WORK - NSSCNAMETXT_GEN_ALL_CORE
[facility] ( for Infusion). [facility] skilled nursing  [facility] ( for Infusion) [facility] skilled nursing

## 2021-06-02 NOTE — DISCHARGE NOTE PROVIDER - PROVIDER TOKENS
PROVIDER:[TOKEN:[[id number]],FOLLOWUP:[1 week]],PROVIDER:[TOKEN:[[id number]],FOLLOWUP:[1 week]]

## 2021-06-02 NOTE — PROGRESS NOTE ADULT - ASSESSMENT
67yo M presenting with finger pain. he states that he slammed his middle finger in a door around 2 weeks ago. States that it was painful and he had a cut/lesion at his nail bed. He reports that it was painful but he was managing. he reports that within the last day he noted it got worse. He reports pain, swelling, redness to the finger now. Denies fevers, chills, chest pain, sob, nausea, vomiting, diarrhea  PT WITH   SWELLING RIGHT MIDDLE FINGER  AND MUCOUS CYST 4TH  FINGER   SED RATE NORMAL  BLOOD X X2 SETS      HAND SURGERY EVAL NOTED   R  XRAY NO FX   CLINICALLY IMPROVED  PT ANXIOUS TO GO HOME   WILL PLAN MIDLINE  IV CEFAZOLIN FOR TWO Weeks  FOLLOW UP IN OFFICE MONDAY  FOLLOW UP WITH HAND SURGERY  IV RX GIVEN TO

## 2021-06-02 NOTE — DISCHARGE NOTE PROVIDER - CARE PROVIDERS DIRECT ADDRESSES
[email or web address]

## 2021-06-02 NOTE — DISCHARGE NOTE PROVIDER - CARE PROVIDER_API CALL
Giovanni iVtale)  Infectious Disease; Internal Medicine  [address]  [place] [address]  Phone: [phone]  Fax: [phone]  Follow Up Time: 1 week    Hamlet Leroy)  Orthopaedic Surgery; Surgery of the Hand  [address] [place] [address]  Phone: [phone]  Fax: [phone]  Follow Up Time: 1 week

## 2021-06-05 ENCOUNTER — TRANSCRIPTION ENCOUNTER (OUTPATIENT)
Age: 66
End: 2021-06-05

## 2021-06-06 LAB
CULTURE RESULTS: SIGNIFICANT CHANGE UP
CULTURE RESULTS: SIGNIFICANT CHANGE UP
SPECIMEN SOURCE: SIGNIFICANT CHANGE UP
SPECIMEN SOURCE: SIGNIFICANT CHANGE UP

## 2021-06-07 ENCOUNTER — APPOINTMENT (OUTPATIENT)
Dept: INTERNAL MEDICINE | Facility: CLINIC | Age: 66
End: 2021-06-07
Payer: MEDICARE

## 2021-06-07 VITALS
SYSTOLIC BLOOD PRESSURE: 130 MMHG | TEMPERATURE: 96.7 F | DIASTOLIC BLOOD PRESSURE: 80 MMHG | WEIGHT: 214 LBS | HEIGHT: 70 IN | BODY MASS INDEX: 30.64 KG/M2

## 2021-06-07 DIAGNOSIS — S69.91XS UNSPECIFIED INJURY OF RIGHT WRIST, HAND AND FINGER(S), SEQUELA: ICD-10-CM

## 2021-06-07 PROBLEM — E11.9 TYPE 2 DIABETES MELLITUS WITHOUT COMPLICATIONS: Chronic | Status: ACTIVE | Noted: 2021-06-01

## 2021-06-07 PROCEDURE — 99215 OFFICE O/P EST HI 40 MIN: CPT

## 2021-06-07 NOTE — HISTORY OF PRESENT ILLNESS
[FreeTextEntry1] : 67YO MALE   HERE FOR FOLLOWUP SEEN AT [facility] FOR INFECTION OF FINGER DISCHARGED ON IV ABX VIA MIDLINE. PT HAD TRAUMA SLAMMED FINGER IN DOOR AND DEVELOPED SUBSEQUENT REDNESS AND EDEMA SENT HOME ON IV CEFAZOLIN WITH IMPROVEMENT SLOW HERE FOR FOLLOWUP

## 2021-06-07 NOTE — ASSESSMENT
[FreeTextEntry1] : 65YO MALE   HERE FOR FOLLOWUP SEEN AT [facility] FOR INFECTION OF FINGER DISCHARGED ON IV ABX VIA MIDLINE. PT HAD TRAUMA SLAMMED FINGER IN DOOR AND DEVELOPED SUBSEQUENT REDNESS AND EDEMA SENT HOME ON IV CEFAZOLIN WITH IMPROVEMENT SLOW HERE FOR FOLLOWUP\par PT FINGER IS XLINICALLY IMPROVED DECREASED EDEMA AND ERYTHEMA  NO DRAINAGE\par ROM HAS IMPROVED\par PT HAS APPT WITH HAND SURGERON THIS WEEK \par WILL FOLLOWUP HER ON MONDAY IN ON E WEEK MOST LIKELY  WILL BE ABLE TO D/C IV ON 6/14 BUT WILL NEED TO FOLLOW C LINICALLY

## 2021-06-14 ENCOUNTER — APPOINTMENT (OUTPATIENT)
Dept: INTERNAL MEDICINE | Facility: CLINIC | Age: 66
End: 2021-06-14
Payer: MEDICARE

## 2021-06-14 VITALS
HEIGHT: 70 IN | SYSTOLIC BLOOD PRESSURE: 120 MMHG | WEIGHT: 215 LBS | DIASTOLIC BLOOD PRESSURE: 80 MMHG | TEMPERATURE: 97.3 F | BODY MASS INDEX: 30.78 KG/M2

## 2021-06-14 PROCEDURE — 99214 OFFICE O/P EST MOD 30 MIN: CPT

## 2021-06-14 NOTE — PHYSICAL EXAM
[General Appearance - Alert] : alert [General Appearance - In No Acute Distress] : in no acute distress [Sclera] : the sclera and conjunctiva were normal [PERRL With Normal Accommodation] : pupils were equal in size, round, reactive to light [Extraocular Movements] : extraocular movements were intact [Outer Ear] : the ears and nose were normal in appearance [Oropharynx] : the oropharynx was normal with no thrush [Neck Appearance] : the appearance of the neck was normal [Neck Cervical Mass (___cm)] : no neck mass was observed [Jugular Venous Distention Increased] : there was no jugular-venous distention [Thyroid Diffuse Enlargement] : the thyroid was not enlarged [Auscultation Breath Sounds / Voice Sounds] : lungs were clear to auscultation bilaterally [Heart Rate And Rhythm] : heart rate was normal and rhythm regular [Heart Sounds] : normal S1 and S2 [Heart Sounds Gallop] : no gallops [Murmurs] : no murmurs [Heart Sounds Pericardial Friction Rub] : no pericardial rub [Full Pulse] : the pedal pulses are present [Edema] : there was no peripheral edema [Bowel Sounds] : normal bowel sounds [Abdomen Soft] : soft [Abdomen Tenderness] : non-tender [Abdomen Mass (___ Cm)] : no abdominal mass palpated [Costovertebral Angle Tenderness] : no CVA tenderness [No Palpable Adenopathy] : no palpable adenopathy [Nail Clubbing] : no clubbing  or cyanosis of the fingernails [Motor Tone] : muscle strength and tone were normal [FreeTextEntry1] : RIGHT HAND MIDLLE FINGER  WITH EDAM AND ERYTHEMA 4TH FINGER IWTH SMALL CYST  [Skin Color & Pigmentation] : normal skin color and pigmentation [] : no rash [Deep Tendon Reflexes (DTR)] : deep tendon reflexes were 2+ and symmetric [Sensation] : the sensory exam was normal to light touch and pinprick [No Focal Deficits] : no focal deficits

## 2021-06-14 NOTE — ASSESSMENT
[FreeTextEntry1] : 67YO MALE   HERE FOR FOLLOWUP SEEN AT [facility] FOR INFECTION OF FINGER DISCHARGED ON IV ABX VIA MIDLINE. PT HAD TRAUMA SLAMMED FINGER IN DOOR AND DEVELOPED SUBSEQUENT REDNESS AND EDEMA SENT HOME ON IV CEFAZOLIN WITH IMPROVEMENT SLOW HERE FOR FOLLOWUP\par HAS SEEN HAND SURGEON WHO FELT IT WAS A PROBABLE SEPTIC ARTHRITIS DIP JOINT FROM  DRAINING  MUCOUS CYST\par PT WITH MUCOUS CUST OTHER FINGER BUT NO SIGN OF INFECTION \par HERE FOR FOLLOW UP HAS BEEN ON KEFZOL\par PT WITH CLINICAL IMPROVEMENT BUT AS CONCERN FOR POSSIBEL DEPTIC ENEDINA COOLEY EXTNED IV ABX FOR ANOTHER 2 WEEKS TO COMOLETE A TLEAST 4 WEKS THEN WILL FOLLOW UP AND POSSIBLE GIVE ANOHTER 2 WEKS ORALLY\par HE WILL FOLLOWUP WITH HAND SURGEON NEXT WEEK

## 2021-06-14 NOTE — HISTORY OF PRESENT ILLNESS
[FreeTextEntry1] : 65YO MALE   HERE FOR FOLLOWUP SEEN AT [facility] FOR INFECTION OF FINGER DISCHARGED ON IV ABX VIA MIDLINE. PT HAD TRAUMA SLAMMED FINGER IN DOOR AND DEVELOPED SUBSEQUENT REDNESS AND EDEMA SENT HOME ON IV CEFAZOLIN WITH IMPROVEMENT SLOW HERE FOR FOLLOWUP\par HAS SEEN HAND SURGEON WHO FELT IT WAS A PROBABLE SEPTIC ARTHRITIS DIP JOINT FROM  DRAINING  MUCOUS CYST\par PT WITH MUCOUS CUST OTHER FINGER BUT NO SIGN OF INFECTION \par HERE FOR FOLLOW UP HAS BEEN ON KEFZOL

## 2021-06-28 ENCOUNTER — APPOINTMENT (OUTPATIENT)
Dept: INTERNAL MEDICINE | Facility: CLINIC | Age: 66
End: 2021-06-28
Payer: MEDICARE

## 2021-06-28 VITALS — TEMPERATURE: 96.5 F

## 2021-06-28 VITALS
WEIGHT: 215 LBS | DIASTOLIC BLOOD PRESSURE: 70 MMHG | BODY MASS INDEX: 30.78 KG/M2 | HEIGHT: 70 IN | SYSTOLIC BLOOD PRESSURE: 112 MMHG

## 2021-06-28 DIAGNOSIS — L03.019 CELLULITIS OF UNSPECIFIED FINGER: ICD-10-CM

## 2021-06-28 DIAGNOSIS — Z79.2 LONG TERM (CURRENT) USE OF ANTIBIOTICS: ICD-10-CM

## 2021-06-28 DIAGNOSIS — L08.9 LOCAL INFECTION OF THE SKIN AND SUBCUTANEOUS TISSUE, UNSPECIFIED: ICD-10-CM

## 2021-06-28 PROCEDURE — 99215 OFFICE O/P EST HI 40 MIN: CPT

## 2021-06-28 RX ORDER — CEFADROXIL 500 MG/1
500 CAPSULE ORAL
Qty: 28 | Refills: 1 | Status: ACTIVE | COMMUNITY
Start: 2021-06-28 | End: 1900-01-01

## 2021-06-29 PROBLEM — Z79.2 RECEIVING INTRAVENOUS ANTIBIOTIC TREATMENT AT HOME: Status: ACTIVE | Noted: 2021-06-07

## 2021-06-29 PROBLEM — L08.9 INFECTION OF RIGHT HAND: Status: ACTIVE | Noted: 2021-06-07

## 2021-06-29 PROBLEM — L03.019 CELLULITIS, FINGER: Status: ACTIVE | Noted: 2021-06-07

## 2021-06-29 NOTE — HISTORY OF PRESENT ILLNESS
[FreeTextEntry1] : 65YO MALE   HERE FOR FOLLOWUP SEEN AT [facility] FOR INFECTION OF FINGER DISCHARGED ON IV ABX VIA MIDLINE. PT HAD TRAUMA SLAMMED FINGER IN DOOR AND DEVELOPED SUBSEQUENT REDNESS AND EDEMA SENT HOME ON IV CEFAZOLIN WITH IMPROVEMENT SLOW HERE FOR FOLLOWUP\par HAS SEEN HAND SURGEON WHO FELT IT WAS A PROBABLE SEPTIC ARTHRITIS DIP JOINT FROM  DRAINING  MUCOUS CYST\par PT WITH MUCOUS CUST OTHER FINGER BUT NO SIGN OF INFECTION \par HERE FOR FOLLOW UP HAS BEEN ON KEFZOL FOR 4 WEEKS \par FEELS WELL

## 2021-06-29 NOTE — ASSESSMENT
[FreeTextEntry1] : 67YO MALE   HERE FOR FOLLOWUP SEEN AT [facility] FOR INFECTION OF FINGER DISCHARGED ON IV ABX VIA MIDLINE. PT HAD TRAUMA SLAMMED FINGER IN DOOR AND DEVELOPED SUBSEQUENT REDNESS AND EDEMA SENT HOME ON IV CEFAZOLIN WITH IMPROVEMENT SLOW HERE FOR FOLLOWUP\par HAS SEEN HAND SURGEON WHO FELT IT WAS A PROBABLE SEPTIC ARTHRITIS DIP JOINT FROM  DRAINING  MUCOUS CYST\par PT WITH MUCOUS CUST OTHER FINGER BUT NO SIGN OF INFECTION \par HERE FOR FOLLOW UP HAS BEEN ON KEFZOL FOR 4 WEEKS \par FEELS WELL\par AND CLINCALLY IMPROVED\par WILL D/C IV \par MUDLINE REMOVED BY ME\par WILL RX DURICEF 500 BID FOR ANOTHER 2 WEEKS\par AND FOLLOWUP WITH HAND SURGEON\par SPOKE TO HAND SURGEON ON THE PHONE \par WILL FOLLOWUP HERE AS NEEDED

## 2021-06-29 NOTE — PHYSICAL EXAM
[General Appearance - Alert] : alert [General Appearance - In No Acute Distress] : in no acute distress [Sclera] : the sclera and conjunctiva were normal [PERRL With Normal Accommodation] : pupils were equal in size, round, reactive to light [Extraocular Movements] : extraocular movements were intact [Outer Ear] : the ears and nose were normal in appearance [Oropharynx] : the oropharynx was normal with no thrush [Neck Appearance] : the appearance of the neck was normal [Neck Cervical Mass (___cm)] : no neck mass was observed [Jugular Venous Distention Increased] : there was no jugular-venous distention [Thyroid Diffuse Enlargement] : the thyroid was not enlarged [Auscultation Breath Sounds / Voice Sounds] : lungs were clear to auscultation bilaterally [Heart Rate And Rhythm] : heart rate was normal and rhythm regular [Heart Sounds] : normal S1 and S2 [Heart Sounds Gallop] : no gallops [Murmurs] : no murmurs [Heart Sounds Pericardial Friction Rub] : no pericardial rub [Full Pulse] : the pedal pulses are present [Edema] : there was no peripheral edema [Bowel Sounds] : normal bowel sounds [Abdomen Soft] : soft [Abdomen Tenderness] : non-tender [Abdomen Mass (___ Cm)] : no abdominal mass palpated [Costovertebral Angle Tenderness] : no CVA tenderness [No Palpable Adenopathy] : no palpable adenopathy [Nail Clubbing] : no clubbing  or cyanosis of the fingernails [Motor Tone] : muscle strength and tone were normal [Skin Color & Pigmentation] : normal skin color and pigmentation [] : no rash [Deep Tendon Reflexes (DTR)] : deep tendon reflexes were 2+ and symmetric [Sensation] : the sensory exam was normal to light touch and pinprick [No Focal Deficits] : no focal deficits [FreeTextEntry1] : RIGHT HAND MIDLLE FINGER  WITH EDAM AND ERYTHEMA 4TH FINGER IWTH SMALL CYST